# Patient Record
Sex: MALE | Race: ASIAN | NOT HISPANIC OR LATINO | URBAN - METROPOLITAN AREA
[De-identification: names, ages, dates, MRNs, and addresses within clinical notes are randomized per-mention and may not be internally consistent; named-entity substitution may affect disease eponyms.]

---

## 2017-01-26 ENCOUNTER — OFFICE VISIT - RIVER FALLS (OUTPATIENT)
Dept: FAMILY MEDICINE | Facility: CLINIC | Age: 32
End: 2017-01-26

## 2017-02-02 ENCOUNTER — OFFICE VISIT - RIVER FALLS (OUTPATIENT)
Dept: FAMILY MEDICINE | Facility: CLINIC | Age: 32
End: 2017-02-02

## 2017-02-06 ENCOUNTER — OFFICE VISIT - RIVER FALLS (OUTPATIENT)
Dept: FAMILY MEDICINE | Facility: CLINIC | Age: 32
End: 2017-02-06

## 2017-02-08 ENCOUNTER — OFFICE VISIT - RIVER FALLS (OUTPATIENT)
Dept: FAMILY MEDICINE | Facility: CLINIC | Age: 32
End: 2017-02-08

## 2017-03-07 ENCOUNTER — OFFICE VISIT - RIVER FALLS (OUTPATIENT)
Dept: FAMILY MEDICINE | Facility: CLINIC | Age: 32
End: 2017-03-07

## 2017-03-10 ENCOUNTER — OFFICE VISIT - RIVER FALLS (OUTPATIENT)
Dept: FAMILY MEDICINE | Facility: CLINIC | Age: 32
End: 2017-03-10

## 2017-03-10 ASSESSMENT — MIFFLIN-ST. JEOR: SCORE: 1550.21

## 2017-04-06 ENCOUNTER — OFFICE VISIT - RIVER FALLS (OUTPATIENT)
Dept: FAMILY MEDICINE | Facility: CLINIC | Age: 32
End: 2017-04-06

## 2017-04-10 ENCOUNTER — OFFICE VISIT - RIVER FALLS (OUTPATIENT)
Dept: FAMILY MEDICINE | Facility: CLINIC | Age: 32
End: 2017-04-10

## 2017-04-14 ENCOUNTER — OFFICE VISIT - RIVER FALLS (OUTPATIENT)
Dept: FAMILY MEDICINE | Facility: CLINIC | Age: 32
End: 2017-04-14

## 2017-05-11 ENCOUNTER — OFFICE VISIT - RIVER FALLS (OUTPATIENT)
Dept: FAMILY MEDICINE | Facility: CLINIC | Age: 32
End: 2017-05-11

## 2017-06-12 ENCOUNTER — AMBULATORY - RIVER FALLS (OUTPATIENT)
Dept: FAMILY MEDICINE | Facility: CLINIC | Age: 32
End: 2017-06-12

## 2017-07-10 ENCOUNTER — AMBULATORY - RIVER FALLS (OUTPATIENT)
Dept: FAMILY MEDICINE | Facility: CLINIC | Age: 32
End: 2017-07-10

## 2022-02-11 VITALS
DIASTOLIC BLOOD PRESSURE: 76 MMHG | SYSTOLIC BLOOD PRESSURE: 133 MMHG | BODY MASS INDEX: 24.11 KG/M2 | HEART RATE: 105 BPM | WEIGHT: 150 LBS | HEIGHT: 66 IN

## 2022-02-16 NOTE — PROGRESS NOTES
"   Patient:   NAYE SETHI            MRN: [id number]            FIN: [id number]               Age:   31 years     Sex:  Male     :  1985   Associated Diagnoses:   None   Author:   Aislinn ADAMS, Nadir MONET: The patient is a 31-year-old from [place] who is currently residing in   [place].  He has a history of allergies to \"everything\".   He had   asthma as a child, but now he has only exertional asthma for which   he uses albuterol. He would like to get back on his shots; he was told   by his allergist in [place] that he needed to see a doctor.     His problem list, his medications, and his allergies are reviewed.  O: BP:  133/76.  WEIGHT:  150 pounds.   GENERAL:  The patient is a healthy-appearing young man.   CHEST:  Clear.  A: Allergies and exercise-induced asthma.  P: Referral to allergist.  D:  2017  T:  2017     Nadir Tao MD, FACP/sak  "